# Patient Record
Sex: FEMALE | Employment: FULL TIME | URBAN - METROPOLITAN AREA
[De-identification: names, ages, dates, MRNs, and addresses within clinical notes are randomized per-mention and may not be internally consistent; named-entity substitution may affect disease eponyms.]

---

## 2022-06-27 ENCOUNTER — HOSPITAL ENCOUNTER (OUTPATIENT)
Age: 27
Discharge: HOME | End: 2022-06-27

## 2022-06-27 PROCEDURE — 86481 TB AG RESPONSE T-CELL SUSP: CPT

## 2022-07-01 LAB — T-SPOT TB TEST: NORMAL

## 2023-01-03 ENCOUNTER — HOSPITAL ENCOUNTER (OUTPATIENT)
Age: 28
Setting detail: SPECIMEN
Discharge: HOME | End: 2023-01-03

## 2023-01-03 DIAGNOSIS — Z11.3 ROUTINE SCREENING FOR STI (SEXUALLY TRANSMITTED INFECTION): ICD-10-CM

## 2023-01-03 DIAGNOSIS — Z12.4 ENCOUNTER FOR SCREENING FOR CERVICAL CANCER: Primary | ICD-10-CM

## 2023-01-03 LAB
CANDIDA SPECIES, DNA PROBE: NEGATIVE
GARDNERELLA VAGINALIS, DNA PROBE: NEGATIVE
SOURCE: NORMAL
TRICHOMONAS VAGINALIS DNA: NEGATIVE

## 2023-01-03 RX ORDER — LIDOCAINE HYDROCHLORIDE 10 MG/ML
3 INJECTION, SOLUTION EPIDURAL; INFILTRATION; INTRACAUDAL; PERINEURAL ONCE
Status: SHIPPED | OUTPATIENT
Start: 2023-01-03

## 2023-01-03 NOTE — H&P
Cecelia Justin is a 32 y.o. female patient. 1. Encounter for screening for cervical cancer    2. Routine screening for STI (sexually transmitted infection)            Pelvic Exam:  Vulva: normal appearing vulva, no masses, tenderness or lesions, normal clitoris   Vagina: Normal appearing vagina with normal color and discharge, no lesions  Cervix: normal appearing cervix without pathologic appearing discharge or lesions, no cervical motion tenderness, IUD strings visible within the cervical canal   Uterus: is normal size, shape, consistency and non-tender   Adnexa: non-tender, no palpable masses   Rectal Exam: not indicated     Small 0.5 cm ingrown hair on left mons. 3 mL of 1% lidocaine injected at the site and 11 blade used to unroof the lesion. Area irrigated and then dressed with a bandage. Pap obtained with reflex HPV,  GC/CT culture obtained and Vaginitis swab.          Jay Hagan MD  1/3/2023

## 2023-01-04 LAB
C TRACH DNA GENITAL QL NAA+PROBE: NEGATIVE
N. GONORRHOEAE DNA: NEGATIVE
SPECIMEN DESCRIPTION: NORMAL

## 2023-04-02 ENCOUNTER — TELEPHONE (OUTPATIENT)
Dept: OBGYN | Age: 28
End: 2023-04-02

## 2023-04-02 RX ORDER — ESCITALOPRAM OXALATE 10 MG/1
10 TABLET ORAL DAILY
Qty: 90 TABLET | Refills: 5 | Status: SHIPPED | OUTPATIENT
Start: 2023-04-02

## 2023-04-02 RX ORDER — LEVOTHYROXINE SODIUM 0.05 MG/1
50 TABLET ORAL DAILY
Qty: 90 TABLET | Refills: 5 | Status: SHIPPED | OUTPATIENT
Start: 2023-04-02

## 2023-04-02 NOTE — TELEPHONE ENCOUNTER
Patient called requesting refills  lexapro 10 mg PO daily and Levothyroxine 50 mg PO daily. Refills sent to pharmacy on file ([facility]) as requested. Recommend PCP appointment for follow up but rx given to continue care. All questions answered.      Nicolas Smart MD

## 2023-05-22 LAB
CHOLEST SERPL-MCNC: 167 MG/DL
CHOLESTEROL/HDL RATIO: 3.3
GLUCOSE SERPL-MCNC: 90 MG/DL (ref 70–99)
HDLC SERPL-MCNC: 50 MG/DL
LDLC SERPL CALC-MCNC: 94 MG/DL (ref 0–130)
PATIENT FASTING?: YES
TRIGL SERPL-MCNC: 114 MG/DL

## 2023-09-07 DIAGNOSIS — R94.8 ABNORMAL METABOLISM: Primary | ICD-10-CM

## 2023-09-19 ENCOUNTER — HOSPITAL ENCOUNTER (OUTPATIENT)
Age: 28
Setting detail: SPECIMEN
Discharge: HOME | End: 2023-09-19

## 2023-09-19 DIAGNOSIS — R94.8 ABNORMAL METABOLISM: ICD-10-CM

## 2023-09-19 LAB
INSULIN COMMENT: NORMAL
INSULIN REFERENCE RANGE:: NORMAL
INSULIN: 17.6 MU/L
T3FREE SERPL-MCNC: 2.45 PG/ML (ref 2.02–4.43)
T4 FREE SERPL-MCNC: 1 NG/DL (ref 0.9–1.7)
TSH SERPL DL<=0.05 MIU/L-ACNC: 3.57 UIU/ML (ref 0.3–5)

## 2023-09-20 LAB
EST. AVERAGE GLUCOSE BLD GHB EST-MCNC: 108 MG/DL
HBA1C MFR BLD: 5.4 % (ref 4–6)

## 2024-02-06 DIAGNOSIS — F41.9 ANXIETY: ICD-10-CM

## 2024-02-06 NOTE — PROGRESS NOTES
Patient called and discuss concerns for anxiety and her medications. Patient wants to establish with mental health provider. Referral sent

## 2024-02-27 SDOH — HEALTH STABILITY: PHYSICAL HEALTH: ON AVERAGE, HOW MANY MINUTES DO YOU ENGAGE IN EXERCISE AT THIS LEVEL?: 30 MIN

## 2024-02-27 SDOH — HEALTH STABILITY: PHYSICAL HEALTH: ON AVERAGE, HOW MANY DAYS PER WEEK DO YOU ENGAGE IN MODERATE TO STRENUOUS EXERCISE (LIKE A BRISK WALK)?: 3 DAYS

## 2024-02-28 ENCOUNTER — OFFICE VISIT (OUTPATIENT)
Dept: PRIMARY CARE CLINIC | Age: 29
End: 2024-02-28
Payer: COMMERCIAL

## 2024-02-28 VITALS
HEIGHT: 64 IN | HEART RATE: 63 BPM | RESPIRATION RATE: 16 BRPM | SYSTOLIC BLOOD PRESSURE: 120 MMHG | DIASTOLIC BLOOD PRESSURE: 82 MMHG | OXYGEN SATURATION: 98 % | BODY MASS INDEX: 30.46 KG/M2 | WEIGHT: 178.4 LBS

## 2024-02-28 DIAGNOSIS — G89.29 CHRONIC RIGHT SHOULDER PAIN: ICD-10-CM

## 2024-02-28 DIAGNOSIS — S46.811A TRAPEZIUS STRAIN, RIGHT, INITIAL ENCOUNTER: ICD-10-CM

## 2024-02-28 DIAGNOSIS — M25.511 CHRONIC RIGHT SHOULDER PAIN: ICD-10-CM

## 2024-02-28 DIAGNOSIS — F41.9 ANXIETY: Primary | ICD-10-CM

## 2024-02-28 DIAGNOSIS — Z87.09 HISTORY OF ASTHMA: ICD-10-CM

## 2024-02-28 DIAGNOSIS — E03.9 HYPOTHYROIDISM, UNSPECIFIED TYPE: ICD-10-CM

## 2024-02-28 PROBLEM — J45.990 EXERCISE-INDUCED ASTHMA: Status: ACTIVE | Noted: 2024-02-28

## 2024-02-28 PROBLEM — J30.2 SEASONAL ALLERGIES: Status: ACTIVE | Noted: 2024-02-28

## 2024-02-28 PROCEDURE — 99204 OFFICE O/P NEW MOD 45 MIN: CPT | Performed by: PHYSICIAN ASSISTANT

## 2024-02-28 RX ORDER — ALBUTEROL SULFATE 90 UG/1
2 AEROSOL, METERED RESPIRATORY (INHALATION) EVERY 4 HOURS PRN
Qty: 18 G | Refills: 0 | Status: SHIPPED | OUTPATIENT
Start: 2024-02-28

## 2024-02-28 RX ORDER — LEVOTHYROXINE SODIUM 0.05 MG/1
50 TABLET ORAL DAILY
Qty: 90 TABLET | Refills: 5 | Status: SHIPPED | OUTPATIENT
Start: 2024-02-28

## 2024-02-28 RX ORDER — ALBUTEROL SULFATE 90 UG/1
2 AEROSOL, METERED RESPIRATORY (INHALATION) EVERY 4 HOURS PRN
COMMUNITY
End: 2024-02-28 | Stop reason: SDUPTHER

## 2024-02-28 RX ORDER — ESCITALOPRAM OXALATE 10 MG/1
15 TABLET ORAL DAILY
Qty: 90 TABLET | Refills: 5 | Status: SHIPPED | OUTPATIENT
Start: 2024-02-28

## 2024-02-28 SDOH — ECONOMIC STABILITY: FOOD INSECURITY: WITHIN THE PAST 12 MONTHS, YOU WORRIED THAT YOUR FOOD WOULD RUN OUT BEFORE YOU GOT MONEY TO BUY MORE.: NEVER TRUE

## 2024-02-28 SDOH — ECONOMIC STABILITY: INCOME INSECURITY: HOW HARD IS IT FOR YOU TO PAY FOR THE VERY BASICS LIKE FOOD, HOUSING, MEDICAL CARE, AND HEATING?: NOT HARD AT ALL

## 2024-02-28 SDOH — ECONOMIC STABILITY: FOOD INSECURITY: WITHIN THE PAST 12 MONTHS, THE FOOD YOU BOUGHT JUST DIDN'T LAST AND YOU DIDN'T HAVE MONEY TO GET MORE.: NEVER TRUE

## 2024-02-28 SDOH — ECONOMIC STABILITY: HOUSING INSECURITY
IN THE LAST 12 MONTHS, WAS THERE A TIME WHEN YOU DID NOT HAVE A STEADY PLACE TO SLEEP OR SLEPT IN A SHELTER (INCLUDING NOW)?: NO

## 2024-02-28 ASSESSMENT — ENCOUNTER SYMPTOMS
VOMITING: 0
COUGH: 0
CONSTIPATION: 0
ABDOMINAL PAIN: 0
SINUS PAIN: 0
DIARRHEA: 0
RHINORRHEA: 0
BACK PAIN: 0
SHORTNESS OF BREATH: 0
NAUSEA: 0

## 2024-02-28 ASSESSMENT — PATIENT HEALTH QUESTIONNAIRE - PHQ9
2. FEELING DOWN, DEPRESSED OR HOPELESS: 0
SUM OF ALL RESPONSES TO PHQ9 QUESTIONS 1 & 2: 0
SUM OF ALL RESPONSES TO PHQ QUESTIONS 1-9: 0
1. LITTLE INTEREST OR PLEASURE IN DOING THINGS: 0

## 2024-02-28 NOTE — PROGRESS NOTES
Negative biceps testing.  Negative liftoff test.  Supraspinatus test is equal bilaterally.  Cervical spine: Mild tenderness to palpation of right trapezius muscle.  No left-sided tenderness.  Range of motion is without limitations.  Strength of upper extremities is equal.  Neurovascularly intact.   Lymphadenopathy:      Cervical: No cervical adenopathy.   Skin:     General: Skin is warm.      Capillary Refill: Capillary refill takes less than 2 seconds.   Neurological:      General: No focal deficit present.      Mental Status: She is alert and oriented to person, place, and time.   Psychiatric:         Mood and Affect: Mood normal.         Behavior: Behavior normal.       /82 (Site: Left Upper Arm, Position: Sitting, Cuff Size: Medium Adult)   Pulse 63   Resp 16   Ht 1.626 m (5' 4\")   Wt 80.9 kg (178 lb 6.4 oz)   LMP  (LMP Unknown)   SpO2 98%   BMI 30.62 kg/m²     Assessment:       ICD-10-CM    1. Anxiety  F41.9 escitalopram (LEXAPRO) 10 MG tablet      2. Hypothyroidism, unspecified type  E03.9 levothyroxine (SYNTHROID) 50 MCG tablet     TSH     T4, Free      3. Chronic right shoulder pain  M25.511 [facility] [place]    G89.29       4. Trapezius strain, right, initial encounter  S46.811A [facility] [place]      5. History of asthma  Z87.09 albuterol sulfate HFA (PROVENTIL;VENTOLIN;PROAIR) 108 (90 Base) MCG/ACT inhaler               Plan:      1.  Patient with history of chronic anxiety.  She is having some breakthrough symptoms despite current Lexapro dose.  She is going to start counseling next week.  We will adjust Lexapro from 10 mg to 15 mg daily.  Discussed instructions and side effects.  Educated on importance of good sleeping hygiene and physical activity.  2.  Patient with history of hypothyroidism.  Last T4 was on the lower side of normal.  Will have her take an extra dose on Sunday and recheck TSH and T4 in a month.  She is symptomatic with

## 2024-03-04 ENCOUNTER — HOSPITAL ENCOUNTER (OUTPATIENT)
Dept: PHYSICAL THERAPY | Facility: CLINIC | Age: 29
Setting detail: THERAPIES SERIES
Discharge: HOME | End: 2024-03-04
Payer: COMMERCIAL

## 2024-03-04 PROCEDURE — 97161 PT EVAL LOW COMPLEX 20 MIN: CPT

## 2024-03-04 PROCEDURE — 97110 THERAPEUTIC EXERCISES: CPT

## 2024-03-04 NOTE — CONSULTS
[] [facility]  Outpatient Rehabilitation &  Therapy  [address]  [phone]  F: [phone] [] [facility]  Outpatient Rehabilitation &  Therapy  [address]   [address]  P: [phone]  F: [phone] [x] [facility] [place]  Outpatient Rehabilitation &  Therapy  [address]  P: [phone]  F: [phone] [] [facility]  Outpatient Rehabilitation &  Therapy  [address]  P: [phone]  F: [phone] [] [facility]  Outpatient Rehabilitation &  Therapy  [address]   [address]   P: [phone]  F: [phone]          Physical Therapy Spine Evaluation    Date:  3/4/2024  Patient: Jerica Johnston  : 1995  MRN: [id number]  Physician: Fidel Vivas PA-C   Insurance: [facility] EMP (MEDICAL REVIEW AFTER 30 VISITS)  Medical Diagnosis:   S46.811A (ICD-10-CM) - Trapezius strain, right, initial encounter   M25.511, G89.29 (ICD-10-CM) - Chronic right shoulder pain   Rehab Codes: M25.60, M54.2, R29.3, M62.81  Onset Date: 10/15/2022  Next 's appt.: tbd       Subjective:  Pt arrived to physical therapy with c/o R upper trap pain radiating to R shoulder and R jaw areas present shortly around 2022 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  Pt reported initially symptoms felt as a discomfort but progressively worsening to a constant dull ache.  Pt reported has been managing symptoms with ibuprofen, lidocaine patches, salon pas, icyhot, and voltaren cream.  Pt also get massage therapy frequently which help relieving symptoms for few days.  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  Pt reported symptoms also worsen when holding/rocking

## 2024-03-05 ENCOUNTER — OFFICE VISIT (OUTPATIENT)
Dept: BEHAVIORAL/MENTAL HEALTH CLINIC | Age: 29
End: 2024-03-05
Payer: COMMERCIAL

## 2024-03-05 DIAGNOSIS — F43.23 ADJUSTMENT DISORDER WITH MIXED ANXIETY AND DEPRESSED MOOD: Primary | ICD-10-CM

## 2024-03-05 PROCEDURE — 90791 PSYCH DIAGNOSTIC EVALUATION: CPT | Performed by: COUNSELOR

## 2024-03-05 NOTE — PROGRESS NOTES
ADULT BEHAVIORAL HEALTH ASSESSMENT  Ruma Oliver       Visit Date: 3/5/2024   Time of appointment:  9:09a   Time spent with Patient: 44 minutes.   This is patient's first appointment.    Reason for Consult:  New Patient     Referring Provider/PCP:    No ref. provider found  Fidel Vivas, [facility]      Pt provided informed consent for the behavioral health program. Discussed with patient model of service to include the limits of confidentiality (i.e. abuse reporting, suicide intervention, etc.) and short-term intervention focused approach. Pt indicated understanding.     PRESENTING PROBLEM AND HISTORY  Jerica is a 28 y.o. female who presents for new evaluation and treatment of  anxiety, depression.  She has the following symptoms: depressed mood, increased appetite, weight gain, fatigue/lack of energy, lack of motivation, excessive guilt, low self-esteem, isolating self, feeling unable to make decisions, excessive talking/pressure to keep talking, feeling nervous, anxious, or on edge, racing worry thoughts, excessive anxiety and worry about specific stressors, inability to stop or control worry, avoidance of situations that provoke fear and anxiety, unexpected panic attacks, nightmares or flashbacks about an experience that was horrible, frightening, or upsetting, trying hard not to think of a horrible, frightening, or upsetting event, feeling numb or detached, and difficulty with attention/concentration.  Onset of symptoms was approximately several years ago.  Symptoms have been fluctuates since that time.  She denies current suicidal and homicidal ideation.  Family history significant for depression.  Risk factors: positive family history in  sister(s).  Previous treatment includes Lexapro.  She complains of the following medication side effects: low libido.    She reported she has a hx of anxiety and depression. She identified she has tried some mindfulness skills in the past (meditation). She reported he has noticed

## 2024-03-07 ENCOUNTER — HOSPITAL ENCOUNTER (OUTPATIENT)
Dept: PHYSICAL THERAPY | Facility: CLINIC | Age: 29
Setting detail: THERAPIES SERIES
Discharge: HOME | End: 2024-03-07
Payer: COMMERCIAL

## 2024-03-07 PROCEDURE — 97110 THERAPEUTIC EXERCISES: CPT

## 2024-03-07 PROCEDURE — 97140 MANUAL THERAPY 1/> REGIONS: CPT

## 2024-03-07 NOTE — FLOWSHEET NOTE
[] [facility]  Outpatient Rehabilitation &  Therapy  [address]  [phone]  [phone] [] [facility]  Outpatient Rehabilitation &  Therapy  [address]  P: [phone]  F: [phone] [x] [facility]  Outpatient Rehabilitation &  Therapy  [address]  P: [phone]  F: [phone] [] [facility]  Outpatient Rehabilitation &  Therapy  [address]  [phone]  [phone] [] [facility]  Outpatient Rehabilitation &  Therapy  [address]   P: [phone]  F: [phone]  [] [facility]  Outpatient Rehabilitation &  Therapy  [address]  P: [phone]  F: [phone] [] [facility]  Outpatient Rehabilitation &  Therapy  [address]  P: [phone]  F: [phone] [] [facility]  Outpatient Rehabilitation &  Therapy  [address]  P: [phone]  F: [phone] [] [facility]  Outpatient Rehabilitation &  Therapy  [address]  P: [phone]  F: [phone]  [] [facility] Outpatient Rehabilitation &  Therapy  [address]  P: [phone]  F: [phone]     Physical Therapy Daily Treatment Note    Date:  3/7/2024  Patient Name:  Jerica Johnston    :  1995  MRN: [id number]  Physician: Fidel Vivas PA-C   Insurance: [facility] EMP (MEDICAL REVIEW AFTER 30 VISITS)  Medical Diagnosis:   S46.811A (ICD-10-CM) - Trapezius strain, right, initial encounter   M25.511, G89.29 (ICD-10-CM) - Chronic right shoulder pain   Rehab Codes: M25.60, M54.2, R29.3, M62.81  Onset Date: 10/15/2022                    Next 's appt.: tbd      Visit# / total visits: 2/10    Cancels/No Shows: 0      Subjective:    Pain:  [x] Yes  [] No  Location: R upper trap  Pain Rating: (0-10 scale) \"soreness\" 1-2/10  Pain

## 2024-03-11 ENCOUNTER — HOSPITAL ENCOUNTER (OUTPATIENT)
Dept: PHYSICAL THERAPY | Facility: CLINIC | Age: 29
Setting detail: THERAPIES SERIES
Discharge: HOME | End: 2024-03-11
Payer: COMMERCIAL

## 2024-03-11 PROCEDURE — 97140 MANUAL THERAPY 1/> REGIONS: CPT

## 2024-03-11 PROCEDURE — 97110 THERAPEUTIC EXERCISES: CPT

## 2024-03-11 NOTE — FLOWSHEET NOTE
[] [facility]  Outpatient Rehabilitation &  Therapy  [address]  [phone]  [phone] [] [facility]  Outpatient Rehabilitation &  Therapy  [address]  P: [phone]  F: [phone] [x] [facility]  Outpatient Rehabilitation &  Therapy  [address]  P: [phone]  F: [phone] [] [facility]  Outpatient Rehabilitation &  Therapy  [address]  [phone]  [phone] [] [facility]  Outpatient Rehabilitation &  Therapy  [address]   P: [phone]  F: [phone]  [] [facility]  Outpatient Rehabilitation &  Therapy  [address]  P: [phone]  F: [phone] [] [facility]  Outpatient Rehabilitation &  Therapy  [address]  P: [phone]  F: [phone] [] [facility]  Outpatient Rehabilitation &  Therapy  [address]  P: [phone]  F: [phone] [] [facility]  Outpatient Rehabilitation &  Therapy  [address]  P: [phone]  F: [phone]  [] [facility] Outpatient Rehabilitation &  Therapy  [address]  P: [phone]  F: [phone]     Physical Therapy Daily Treatment Note    Date:  3/11/2024  Patient Name:  Jerica Johnston    :  1995  MRN: [id number]  Physician: Fidel Vivas PA-C   Insurance: [facility] EMP (MEDICAL REVIEW AFTER 30 VISITS)  Medical Diagnosis:   S46.811A (ICD-10-CM) - Trapezius strain, right, initial encounter   M25.511, G89.29 (ICD-10-CM) - Chronic right shoulder pain   Rehab Codes: M25.60, M54.2, R29.3, M62.81  Onset Date: 10/15/2022                    Next 's appt.: tbd      Visit# / total visits: 3/10    Cancels/No Shows: 0      Subjective:    Pain:  [x] Yes  [] No  Location: R upper trap  Pain Rating: (0-10 scale) \"soreness\" -2/10  Pain

## 2024-03-18 ENCOUNTER — HOSPITAL ENCOUNTER (OUTPATIENT)
Dept: PHYSICAL THERAPY | Facility: CLINIC | Age: 29
Setting detail: THERAPIES SERIES
Discharge: HOME | End: 2024-03-18
Payer: COMMERCIAL

## 2024-03-18 NOTE — FLOWSHEET NOTE
[] [facility]  Outpatient Rehabilitation &  Therapy  [address]  [phone]  [phone] [] [facility]  Outpatient Rehabilitation &  Therapy  [address]  P: [phone]  F: [phone] [x] [facility]  Outpatient Rehabilitation &  Therapy  [address]  P: [phone]  F: [phone] [] [facility]  Outpatient Rehabilitation &  Therapy  [address]  [phone]  [phone] [] [facility]  Outpatient Rehabilitation &  Therapy  [address]   P: [phone]  F: [phone]  [] [facility]  Outpatient Rehabilitation &  Therapy  [address]  P: [phone]  F: [phone] [] [facility]  Outpatient Rehabilitation &  Therapy  [address]  P: [phone]  F: [phone] [] [facility]  Outpatient Rehabilitation &  Therapy  [address]  P: [phone]  F: [phone] [] [facility]  Outpatient Rehabilitation &  Therapy  [address]  P: [phone]  F: [phone]  [] [facility] Outpatient Rehabilitation &  Therapy  [address]  P: [phone]  F: [phone]     Therapy Cancel/No Show note    Date: 3/18/2024  Patient: Jerica Johnston  : 1995  MRN: [id number]    Cancels/No Shows to date: 1/0    For today's appointment patient:    [x]  Cancelled    [] Rescheduled appointment    [] No-show     Reason given by patient:    []  Patient ill    []  Conflicting appointment    [] No transportation      [x] Conflict with work    [] No reason given    [] Weather related    [] COVID-19    [] Other:      Comments:        [x] Next appointment was confirmed    Electronically signed by: Enma Trivedi PT

## 2024-06-21 ENCOUNTER — TELEPHONE (OUTPATIENT)
Dept: OBGYN | Age: 29
End: 2024-06-21

## 2024-06-21 DIAGNOSIS — U07.1 COVID-19: Primary | ICD-10-CM

## 2024-06-26 RX ORDER — BENZOCAINE/MENTHOL 6 MG-10 MG
LOZENGE MUCOUS MEMBRANE
Qty: 30 G | Refills: 1 | Status: SHIPPED | OUTPATIENT
Start: 2024-06-26 | End: 2024-07-03

## 2024-09-10 LAB
CHOLEST SERPL-MCNC: 176 MG/DL (ref 0–199)
CHOLESTEROL/HDL RATIO: 4
GLUCOSE SERPL-MCNC: 102 MG/DL (ref 74–99)
HDLC SERPL-MCNC: 45 MG/DL
LDLC SERPL CALC-MCNC: 117 MG/DL (ref 0–100)
PATIENT FASTING?: YES
TRIGL SERPL-MCNC: 74 MG/DL
VLDLC SERPL CALC-MCNC: 15 MG/DL

## 2024-12-27 ENCOUNTER — TELEPHONE (OUTPATIENT)
Dept: OBGYN | Age: 29
End: 2024-12-27

## 2024-12-27 DIAGNOSIS — E03.9 HYPOTHYROIDISM, UNSPECIFIED TYPE: Primary | ICD-10-CM

## 2024-12-27 DIAGNOSIS — F43.23 ADJUSTMENT DISORDER WITH MIXED ANXIETY AND DEPRESSED MOOD: ICD-10-CM

## 2024-12-27 RX ORDER — LEVOTHYROXINE SODIUM 50 UG/1
50 TABLET ORAL DAILY
Qty: 365 TABLET | Refills: 0 | Status: SHIPPED | OUTPATIENT
Start: 2024-12-27

## 2024-12-27 RX ORDER — ESCITALOPRAM OXALATE 10 MG/1
15 TABLET ORAL DAILY
Qty: 365 TABLET | Refills: 0 | Status: SHIPPED | OUTPATIENT
Start: 2024-12-27

## 2024-12-27 NOTE — TELEPHONE ENCOUNTER
Patient called clinic requesting refill on medications (lexapro and synthroid). Refill sent, patient encouraged to follow up with PCP and OBGYN for annual exam.

## 2025-02-11 DIAGNOSIS — M79.673 PAIN OF FOOT, UNSPECIFIED LATERALITY: Primary | ICD-10-CM

## 2025-02-12 ENCOUNTER — TELEPHONE (OUTPATIENT)
Dept: OBGYN | Age: 30
End: 2025-02-12

## 2025-02-12 DIAGNOSIS — Z11.1 SCREENING FOR TUBERCULOSIS: Primary | ICD-10-CM

## 2025-02-13 ENCOUNTER — HOSPITAL ENCOUNTER (OUTPATIENT)
Age: 30
Setting detail: SPECIMEN
Discharge: HOME | End: 2025-02-13

## 2025-02-13 DIAGNOSIS — Z11.1 SCREENING FOR TUBERCULOSIS: ICD-10-CM

## 2025-02-15 LAB
QUANTI TB GOLD PLUS: NEGATIVE
QUANTI TB1 MINUS NIL: 0.05 IU/ML
QUANTI TB2 MINUS NIL: 0.06 IU/ML
QUANTIFERON MITOGEN: 9.88 IU/ML
QUANTIFERON NIL: 0.13 IU/ML

## 2025-05-14 ENCOUNTER — TELEPHONE (OUTPATIENT)
Dept: PODIATRY | Age: 30
End: 2025-05-14

## 2025-05-14 NOTE — TELEPHONE ENCOUNTER
Patient called to cancel her new patient appointment today due to a work conflict. Patient states she had a case added onto her schedule this afternoon [facility]).  She is asking to be seen on the Thursday or Friday in June as that's when she is best available, Any location is fine.    Please return her call to discuss.     Thank you.

## 2025-07-08 DIAGNOSIS — F41.9 ANXIETY: Primary | ICD-10-CM

## 2025-07-08 DIAGNOSIS — R11.0 NAUSEA: ICD-10-CM

## 2025-07-08 DIAGNOSIS — E03.9 HYPOTHYROIDISM, UNSPECIFIED TYPE: ICD-10-CM

## 2025-07-08 DIAGNOSIS — F43.23 ADJUSTMENT DISORDER WITH MIXED ANXIETY AND DEPRESSED MOOD: ICD-10-CM

## 2025-07-08 RX ORDER — ONDANSETRON 4 MG/1
4 TABLET, FILM COATED ORAL DAILY PRN
Qty: 90 TABLET | Refills: 3 | Status: SHIPPED | OUTPATIENT
Start: 2025-07-08

## 2025-07-08 RX ORDER — LEVOTHYROXINE SODIUM 50 UG/1
50 TABLET ORAL DAILY
Qty: 100 TABLET | Refills: 3 | Status: SHIPPED | OUTPATIENT
Start: 2025-07-08

## 2025-07-08 RX ORDER — ESCITALOPRAM OXALATE 10 MG/1
15 TABLET ORAL DAILY
Qty: 150 TABLET | Refills: 3 | Status: SHIPPED | OUTPATIENT
Start: 2025-07-08